# Patient Record
Sex: MALE | Race: WHITE | NOT HISPANIC OR LATINO | URBAN - NONMETROPOLITAN AREA
[De-identification: names, ages, dates, MRNs, and addresses within clinical notes are randomized per-mention and may not be internally consistent; named-entity substitution may affect disease eponyms.]

---

## 2021-12-09 ENCOUNTER — IMPORTED ENCOUNTER (OUTPATIENT)
Dept: URBAN - NONMETROPOLITAN AREA CLINIC 1 | Facility: CLINIC | Age: 76
End: 2021-12-09

## 2021-12-09 PROBLEM — H52.4: Noted: 2021-12-09

## 2021-12-09 PROBLEM — H40.013: Noted: 2021-12-09

## 2021-12-09 PROBLEM — H25.813: Noted: 2021-12-09

## 2021-12-09 PROCEDURE — 92004 COMPRE OPH EXAM NEW PT 1/>: CPT

## 2021-12-09 PROCEDURE — 92015 DETERMINE REFRACTIVE STATE: CPT

## 2021-12-09 NOTE — PATIENT DISCUSSION
Presbyopia OUDiscussed refractive status in detail with patient. New glasses Rx given today. Continue to monitor. Combined Cataract OUDiscussed diagnosis with patient. Reviewed symptoms related to cataract progression. Discussed various treatment options with patient. Recommend cataract evaluation by Dr. Shawn Vargas. Patient defers treatment at this time. Continue to monitor. RTC in 6 months with BATGlaucoma Suspect OUDiscussed diagnosis in detail with patient. Negative family history IOP at 16 OU. Cup to disc noted at 0.75 OD and 0.7 OS. Continue to monitor. RTC in 6 months with VF 24-2 OCT and Pach; Dr's Notes: MR  12/9/21DFE  12/9/21OCTVFOPTOSGONIJUSCH

## 2022-03-17 NOTE — PATIENT DISCUSSION
The types of intraocular lenses were reviewed with the patient along with a discussion of their various strengths and weaknesses. Candidate for all lens options.

## 2022-03-17 NOTE — PATIENT DISCUSSION
The patient was informed that with the Basic + option and a near vision goal, there is no guarantee of achieving near vision without glasses after surgery. They will most likely need prescription glasses at all focal points. The patient elects Basic + OU, goal of -1.50 OU. Will target computer distance.

## 2022-04-15 ASSESSMENT — TONOMETRY
OS_IOP_MMHG: 17
OD_IOP_MMHG: 17

## 2022-04-15 ASSESSMENT — VISUAL ACUITY
OS_SC: 20/100
OD_SC: 20/22-2

## 2022-06-10 ENCOUNTER — FOLLOW UP (OUTPATIENT)
Dept: URBAN - NONMETROPOLITAN AREA CLINIC 1 | Facility: CLINIC | Age: 77
End: 2022-06-10

## 2022-06-10 DIAGNOSIS — H40.013: ICD-10-CM

## 2022-06-10 DIAGNOSIS — H52.4: ICD-10-CM

## 2022-06-10 PROCEDURE — 99214 OFFICE O/P EST MOD 30 MIN: CPT

## 2022-06-10 PROCEDURE — 92083 EXTENDED VISUAL FIELD XM: CPT

## 2022-06-10 PROCEDURE — 76514 ECHO EXAM OF EYE THICKNESS: CPT

## 2022-06-10 PROCEDURE — 92133 CPTRZD OPH DX IMG PST SGM ON: CPT

## 2022-06-10 ASSESSMENT — VISUAL ACUITY
OS_CC: 20/40
OD_PH: 20/40
OD_CC: 20/50-1

## 2022-06-10 ASSESSMENT — TONOMETRY
OS_IOP_MMHG: 15
OD_IOP_MMHG: 15

## 2022-06-10 ASSESSMENT — PACHYMETRY
OD_CT_UM: 505
OS_CT_UM: 509

## 2022-06-10 NOTE — PATIENT DISCUSSION
Discussed diagnosis in detail with patient. Reviewed symptoms related to cataract progression. Recommend refer to Dr. Liya Newell for cataract evaluation. Patient defers at this time. Continue to monitor.